# Patient Record
Sex: FEMALE | Race: OTHER | NOT HISPANIC OR LATINO | URBAN - METROPOLITAN AREA
[De-identification: names, ages, dates, MRNs, and addresses within clinical notes are randomized per-mention and may not be internally consistent; named-entity substitution may affect disease eponyms.]

---

## 2024-04-17 ENCOUNTER — EMERGENCY (EMERGENCY)
Facility: HOSPITAL | Age: 34
LOS: 0 days | Discharge: HOME | End: 2024-04-17
Attending: STUDENT IN AN ORGANIZED HEALTH CARE EDUCATION/TRAINING PROGRAM
Payer: MEDICAID

## 2024-04-17 VITALS
DIASTOLIC BLOOD PRESSURE: 72 MMHG | TEMPERATURE: 95 F | OXYGEN SATURATION: 99 % | HEART RATE: 114 BPM | HEIGHT: 62 IN | RESPIRATION RATE: 18 BRPM | SYSTOLIC BLOOD PRESSURE: 125 MMHG | WEIGHT: 139.99 LBS

## 2024-04-17 VITALS — HEART RATE: 97 BPM

## 2024-04-17 DIAGNOSIS — R42 DIZZINESS AND GIDDINESS: ICD-10-CM

## 2024-04-17 DIAGNOSIS — O99.891 OTHER SPECIFIED DISEASES AND CONDITIONS COMPLICATING PREGNANCY: ICD-10-CM

## 2024-04-17 DIAGNOSIS — Z3A.12 12 WEEKS GESTATION OF PREGNANCY: ICD-10-CM

## 2024-04-17 LAB
ALBUMIN SERPL ELPH-MCNC: 4.6 G/DL — SIGNIFICANT CHANGE UP (ref 3.5–5.2)
ALP SERPL-CCNC: 61 U/L — SIGNIFICANT CHANGE UP (ref 30–115)
ALT FLD-CCNC: 11 U/L — SIGNIFICANT CHANGE UP (ref 0–41)
ANION GAP SERPL CALC-SCNC: 13 MMOL/L — SIGNIFICANT CHANGE UP (ref 7–14)
APPEARANCE UR: ABNORMAL
AST SERPL-CCNC: 16 U/L — SIGNIFICANT CHANGE UP (ref 0–41)
BACTERIA # UR AUTO: ABNORMAL /HPF
BASOPHILS # BLD AUTO: 0.01 K/UL — SIGNIFICANT CHANGE UP (ref 0–0.2)
BASOPHILS NFR BLD AUTO: 0.1 % — SIGNIFICANT CHANGE UP (ref 0–1)
BILIRUB SERPL-MCNC: <0.2 MG/DL — SIGNIFICANT CHANGE UP (ref 0.2–1.2)
BILIRUB UR-MCNC: NEGATIVE — SIGNIFICANT CHANGE UP
BUN SERPL-MCNC: 6 MG/DL — LOW (ref 10–20)
CALCIUM SERPL-MCNC: 9.6 MG/DL — SIGNIFICANT CHANGE UP (ref 8.4–10.5)
CHLORIDE SERPL-SCNC: 100 MMOL/L — SIGNIFICANT CHANGE UP (ref 98–110)
CO2 SERPL-SCNC: 21 MMOL/L — SIGNIFICANT CHANGE UP (ref 17–32)
COLOR SPEC: YELLOW — SIGNIFICANT CHANGE UP
CREAT SERPL-MCNC: 0.6 MG/DL — LOW (ref 0.7–1.5)
DIFF PNL FLD: ABNORMAL
EGFR: 122 ML/MIN/1.73M2 — SIGNIFICANT CHANGE UP
EOSINOPHIL # BLD AUTO: 0.09 K/UL — SIGNIFICANT CHANGE UP (ref 0–0.7)
EOSINOPHIL NFR BLD AUTO: 1 % — SIGNIFICANT CHANGE UP (ref 0–8)
EPI CELLS # UR: SIGNIFICANT CHANGE UP
GLUCOSE SERPL-MCNC: 89 MG/DL — SIGNIFICANT CHANGE UP (ref 70–99)
GLUCOSE UR QL: NEGATIVE MG/DL — SIGNIFICANT CHANGE UP
HCG SERPL-ACNC: HIGH MIU/ML
HCT VFR BLD CALC: 35.4 % — LOW (ref 37–47)
HGB BLD-MCNC: 12.7 G/DL — SIGNIFICANT CHANGE UP (ref 12–16)
IMM GRANULOCYTES NFR BLD AUTO: 0.2 % — SIGNIFICANT CHANGE UP (ref 0.1–0.3)
KETONES UR-MCNC: NEGATIVE MG/DL — SIGNIFICANT CHANGE UP
LEUKOCYTE ESTERASE UR-ACNC: ABNORMAL
LYMPHOCYTES # BLD AUTO: 2.4 K/UL — SIGNIFICANT CHANGE UP (ref 1.2–3.4)
LYMPHOCYTES # BLD AUTO: 27 % — SIGNIFICANT CHANGE UP (ref 20.5–51.1)
MCHC RBC-ENTMCNC: 30.6 PG — SIGNIFICANT CHANGE UP (ref 27–31)
MCHC RBC-ENTMCNC: 35.9 G/DL — SIGNIFICANT CHANGE UP (ref 32–37)
MCV RBC AUTO: 85.3 FL — SIGNIFICANT CHANGE UP (ref 81–99)
MONOCYTES # BLD AUTO: 0.53 K/UL — SIGNIFICANT CHANGE UP (ref 0.1–0.6)
MONOCYTES NFR BLD AUTO: 6 % — SIGNIFICANT CHANGE UP (ref 1.7–9.3)
NEUTROPHILS # BLD AUTO: 5.83 K/UL — SIGNIFICANT CHANGE UP (ref 1.4–6.5)
NEUTROPHILS NFR BLD AUTO: 65.7 % — SIGNIFICANT CHANGE UP (ref 42.2–75.2)
NITRITE UR-MCNC: NEGATIVE — SIGNIFICANT CHANGE UP
NRBC # BLD: 0 /100 WBCS — SIGNIFICANT CHANGE UP (ref 0–0)
PH UR: 7.5 — SIGNIFICANT CHANGE UP (ref 5–8)
PLATELET # BLD AUTO: 235 K/UL — SIGNIFICANT CHANGE UP (ref 130–400)
PMV BLD: 11.7 FL — HIGH (ref 7.4–10.4)
POTASSIUM SERPL-MCNC: 4.2 MMOL/L — SIGNIFICANT CHANGE UP (ref 3.5–5)
POTASSIUM SERPL-SCNC: 4.2 MMOL/L — SIGNIFICANT CHANGE UP (ref 3.5–5)
PROT SERPL-MCNC: 7.9 G/DL — SIGNIFICANT CHANGE UP (ref 6–8)
PROT UR-MCNC: SIGNIFICANT CHANGE UP MG/DL
RBC # BLD: 4.15 M/UL — LOW (ref 4.2–5.4)
RBC # FLD: 14.2 % — SIGNIFICANT CHANGE UP (ref 11.5–14.5)
RBC CASTS # UR COMP ASSIST: 4 /HPF — SIGNIFICANT CHANGE UP (ref 0–4)
SODIUM SERPL-SCNC: 134 MMOL/L — LOW (ref 135–146)
SP GR SPEC: 1.02 — SIGNIFICANT CHANGE UP (ref 1–1.03)
UROBILINOGEN FLD QL: 0.2 MG/DL — SIGNIFICANT CHANGE UP (ref 0.2–1)
WBC # BLD: 8.88 K/UL — SIGNIFICANT CHANGE UP (ref 4.8–10.8)
WBC # FLD AUTO: 8.88 K/UL — SIGNIFICANT CHANGE UP (ref 4.8–10.8)
WBC UR QL: 15 /HPF — HIGH (ref 0–5)

## 2024-04-17 PROCEDURE — 80053 COMPREHEN METABOLIC PANEL: CPT

## 2024-04-17 PROCEDURE — 86850 RBC ANTIBODY SCREEN: CPT

## 2024-04-17 PROCEDURE — 85025 COMPLETE CBC W/AUTO DIFF WBC: CPT

## 2024-04-17 PROCEDURE — 84702 CHORIONIC GONADOTROPIN TEST: CPT

## 2024-04-17 PROCEDURE — 76830 TRANSVAGINAL US NON-OB: CPT | Mod: 26

## 2024-04-17 PROCEDURE — 99284 EMERGENCY DEPT VISIT MOD MDM: CPT | Mod: 25

## 2024-04-17 PROCEDURE — 76830 TRANSVAGINAL US NON-OB: CPT

## 2024-04-17 PROCEDURE — 36415 COLL VENOUS BLD VENIPUNCTURE: CPT

## 2024-04-17 PROCEDURE — 86901 BLOOD TYPING SEROLOGIC RH(D): CPT

## 2024-04-17 PROCEDURE — 81001 URINALYSIS AUTO W/SCOPE: CPT

## 2024-04-17 PROCEDURE — 86900 BLOOD TYPING SEROLOGIC ABO: CPT

## 2024-04-17 PROCEDURE — 99284 EMERGENCY DEPT VISIT MOD MDM: CPT

## 2024-04-17 RX ORDER — CEPHALEXIN 500 MG
1 CAPSULE ORAL
Qty: 28 | Refills: 0
Start: 2024-04-17 | End: 2024-04-23

## 2024-04-17 RX ORDER — SODIUM CHLORIDE 9 MG/ML
1000 INJECTION, SOLUTION INTRAVENOUS ONCE
Refills: 0 | Status: COMPLETED | OUTPATIENT
Start: 2024-04-17 | End: 2024-04-17

## 2024-04-17 RX ADMIN — SODIUM CHLORIDE 1000 MILLILITER(S): 9 INJECTION, SOLUTION INTRAVENOUS at 17:28

## 2024-04-17 NOTE — ED ADULT NURSE NOTE - NSFALLUNIVINTERV_ED_ALL_ED
Bed/Stretcher in lowest position, wheels locked, appropriate side rails in place/Call bell, personal items and telephone in reach/Instruct patient to call for assistance before getting out of bed/chair/stretcher/Non-slip footwear applied when patient is off stretcher/[place] to call system/Physically safe environment - no spills, clutter or unnecessary equipment/Purposeful proactive rounding/Room/bathroom lighting operational, light cord in reach

## 2024-04-17 NOTE — ED ADULT TRIAGE NOTE - CHIEF COMPLAINT QUOTE
pt c/o of dizziness/lethargy and no menstrual x2 months pt c/o of dizziness/lethargy and no menses x2 months

## 2024-04-17 NOTE — ED PROVIDER NOTE - OBJECTIVE STATEMENT
34-year-old female G3, , LMP 2023, and with no past medical history who presents to the ED for evaluation.  Reports that she missed her period in the past 2 months and tested positive for pregnancy at home, so came to the ED requesting for ultrasound to find out how many weeks she has been pregnant.  Denies headache, fever, shortness of breath, chest pain, nausea, vomiting, abdominal pain, vaginal bleeding, and urinary symptoms.

## 2024-04-17 NOTE — ED PROVIDER NOTE - CLINICAL SUMMARY MEDICAL DECISION MAKING FREE TEXT BOX
35 yo female, no pertinent PMHx, , presenting for positive pregnancy test at home today. LMP 24. Denies symptoms. Patient has an ob-gyn to follow with but she wanted to know how far along she was before scheduling an appointment. Denies fevers, chills, chest pain, shortness of breath, nausea, vomiting, abdominal pain, vaginal bleeding, vaginal discharge, urinary symptoms. Well appearing on exam. Heart RRR. Lungs CTAB. Abdomen soft NDNT. No leg swelling. Labs were ordered and reviewed.  Imaging was ordered and reviewed by me. 33 yo female, no pertinent PMHx, , presenting for positive pregnancy test at home today. LMP 24. Denies symptoms. Patient has an ob-gyn to follow with but she wanted to know how far along she was before scheduling an appointment. Denies fevers, chills, chest pain, shortness of breath, nausea, vomiting, abdominal pain, vaginal bleeding, vaginal discharge, urinary symptoms. Well appearing on exam. Heart RRR. Lungs CTAB. Abdomen soft NDNT. No leg swelling. Labs were ordered and reviewed.  Imaging was ordered and reviewed by me. Confirmed IUP 5w6d. Discussed all results with patient. Given return precautions and follow up outpatient. Patient comfortable with plan and has established OB-gyn follow up. 35 yo female, no pertinent PMHx, , presenting for positive pregnancy test at home today. LMP 24. Denies symptoms. Patient has an ob-gyn to follow with but she wanted to know how far along she was before scheduling an appointment. Denies fevers, chills, chest pain, shortness of breath, nausea, vomiting, abdominal pain, vaginal bleeding, vaginal discharge, urinary symptoms. Well appearing on exam. Heart RRR. Lungs CTAB. Abdomen soft NDNT. No leg swelling. Labs were ordered and reviewed.  Imaging was ordered and reviewed by me. Confirmed IUP 6w1d. Discussed all results with patient. Given return precautions and follow up outpatient. Patient comfortable with plan and has established OB-gyn follow up.

## 2024-04-17 NOTE — ED PROVIDER NOTE - PROGRESS NOTE DETAILS
Labs unremarkable.  Urine is contaminated, but will send antibiotics to pharmacy since patient is already pregnant.  Patient reports that she has only obstetrician and will be able to follow-up.

## 2024-04-17 NOTE — ED PROVIDER NOTE - NSFOLLOWUPINSTRUCTIONS_ED_ALL_ED_FT
Preparing for Pregnancy  If you are planning to become pregnant, talk to your health care provider about preconception care. This type of care helps you prepare for a safe and healthy pregnancy. During this visit, your health care provider will:  Do a complete physical exam, including a vaginal exam.  Take your complete medical history.  Give you information, answer your questions, and address possible problems.  What should be on my preconception checklist?  Medical history    Tell your health care provider about any medical conditions you have or have had. Your pregnancy or your ability to become pregnant may be affected by long-term (chronic) conditions, such as:  Diabetes.  High blood pressure (hypertension).  Problems with your thyroid.  Tell your health care provider about your family's medical history and your partner's medical history.  If needed, discuss the benefits of genetic testing. This checks for conditions that may be passed from parent to child.  Tell your health care provider if you have or have had any sexually transmitted infections (STIs). These can affect your pregnancy. In some cases, they can pass to your baby.  Tell your health care provider about:  Any problems you had with previous pregnancies.  Any medicines you take. These include vitamins, herbs, supplements, and over-the-counter medicines.  Your vaccine history. Discuss any vaccines that you may need.  Diet    Ask your health care provider what foods to eat to get a balance of nutrients. This is very important when you are pregnant or preparing to get pregnant.  Take a folic acid supplement of 400 mcg daily and eat foods rich in folic acid to help prevent certain birth defects.  You should also take a prenatal vitamin before pregnancy and keep taking it while pregnant.  Ask your health care provider to help you reach a healthy weight before pregnancy.  If you are overweight, you may have a higher risk for certain problems. These include having trouble getting pregnant (infertility), hypertension, diabetes, and early () birth.  If you are underweight, you are more likely to have a baby who has a low birth weight.  Lifestyle, work, and home    Let your health care provider know about:  Use of alcohol, illegal drugs, or tobacco. These products can cause serious problems for your pregnancy and your baby.  Fun and leisure activities that may put you at risk during pregnancy. These may include skiing and certain exercise programs.  Any plans to travel out of the country, especially to places with an active outbreak of Zika virus.  Harmful substances that you may be exposed to at work or home. These include chemicals, pesticides, radiation, and substances from cat litter boxes.  Any concerns you have for your safety at home.  Mental health    Tell your health care provider about:  Any history of mental health conditions, including feelings of depression, sadness, or anxiety.  Any thoughts of suicide or previous attempts of suicide.  Any medicines that you take for a mental health condition. These include herbs and supplements.  How do I know that I am pregnant?  You may be pregnant if you have been sexually active, and you miss your period. Other symptoms of early pregnancy may include:  Mild cramping.  Feeling more tired than usual.  Nausea and vomiting. These may be signs of morning sickness.  Breast enlargement and tenderness.  Take a home pregnancy test if you have any of these symptoms. This test checks for a hormone in your urine called human chorionic gonadotropin, or hCG. Your body begins to make this hormone during early pregnancy.  Wait until at least the first day after you miss your period to take a test.  What should I do if I become pregnant?  Schedule a visit with your health care provider as soon as you suspect you are pregnant.  Talk to your health care provider if you are taking medicines to see if they are safe to take during pregnancy.  Follow these instructions at home:  Eating and drinking    A plate divided into sections labeled vegetables, protein, fruits, and grains. Choosemyplate.gov.  Follow instructions from your health care provider about what you may eat and drink.  Drink enough fluids to keep your urine pale yellow.  Eat a balanced diet. This includes fresh fruits and vegetables, whole grains, lean meats, low-fat dairy products, healthy fats, and foods that are high in fiber.  Wash all fresh fruits and vegetables before eating.  Ask to meet with a nutritionist or registered dietitian for help with meal planning and goals.  Avoid eating raw or undercooked meat and seafood.  Avoid eating or drinking unpasteurized dairy products.  Heat all processed foods to 165°F. This includes precooked or cured meat, such as sausages or meat loaves.  Lifestyle    A sign telling a person not to smoke.   A bottle of beer, a glass of wine, and a glass of hard liquor with a "do not drink" sign over them.  Get regular exercise. Try to be active for at least 30 minutes a day on most days of the week. Ask your health care provider which activities are safe during pregnancy.  Ask your health care provider about ways to manage stress.  Maintain a healthy weight.  Avoid toxic fumes and chemicals.  Avoid cleaning cat litter boxes. Cat stool (feces) may contain a harmful parasite called toxoplasma.  Avoid travel to countries where Zika virus is common.  Do not use any products that contain nicotine or tobacco. These products include cigarettes, chewing tobacco, and vaping devices, such as e-cigarettes. If you need help quitting, ask your health care provider.  Do not drink alcohol or use drugs.  General instructions    Keep an accurate record of your menstrual periods. This makes it easier for your health care provider to determine your baby's due date.  Take over-the-counter and prescription medicines only as told by your health care provider.  Manage any chronic conditions, such as hypertension and diabetes, as told by your health care provider.  This information is not intended to replace advice given to you by your health care provider. Make sure you discuss any questions you have with your health care provider.

## 2024-04-17 NOTE — ED PROVIDER NOTE - PATIENT PORTAL LINK FT
You can access the FollowMyHealth Patient Portal offered by [facility] by registering at the following website: [email or web address]. By joining [facility]’s FollowMyHealth portal, you will also be able to view your health information using other applications (apps) compatible with our system.
